# Patient Record
Sex: FEMALE | Race: WHITE | Employment: FULL TIME | URBAN - METROPOLITAN AREA
[De-identification: names, ages, dates, MRNs, and addresses within clinical notes are randomized per-mention and may not be internally consistent; named-entity substitution may affect disease eponyms.]

---

## 2017-02-02 ENCOUNTER — HOSPITAL ENCOUNTER (OUTPATIENT)
Dept: ULTRASOUND IMAGING | Age: 37
Discharge: HOME | End: 2017-02-02
Attending: NURSE PRACTITIONER
Payer: COMMERCIAL

## 2017-02-02 DIAGNOSIS — R10.13 EPIGASTRIC ABDOMINAL PAIN: ICD-10-CM

## 2017-02-02 DIAGNOSIS — R74.01 ELEVATED ALT MEASUREMENT: ICD-10-CM

## 2017-02-02 DIAGNOSIS — K92.1 MELENA: ICD-10-CM

## 2017-02-02 DIAGNOSIS — R74.01 ELEVATED AST (SGOT): ICD-10-CM

## 2017-02-02 PROCEDURE — 76700 US EXAM ABDOM COMPLETE: CPT

## 2017-02-02 NOTE — PROGRESS NOTES
Abdominal U/S Impression: Negative for gallstones or biliary tree obstruction. Probable fatty infiltration of the liver. Please notify patient of results/obtain updated plan of care per GI.

## 2017-02-03 NOTE — PROGRESS NOTES
I called patient and discuss her ultrasound results. Patient verbalized understanding on all and states that she saw GI yesterday and they sent her for an endoscopy this morning. Says they didn't find anything so they are getting her scheduled for a colonoscopy next.

## 2018-06-01 PROBLEM — Z72.0 TOBACCO USE: Chronic | Status: ACTIVE | Noted: 2018-06-01

## 2018-06-01 PROBLEM — R74.8 ELEVATED LIVER ENZYMES: Status: ACTIVE | Noted: 2018-06-01

## 2018-06-01 PROBLEM — N93.9 ABNORMAL UTERINE BLEEDING: Status: ACTIVE | Noted: 2018-05-30

## 2022-03-18 PROBLEM — Z72.0 TOBACCO USE: Status: ACTIVE | Noted: 2018-06-01

## 2022-03-19 PROBLEM — N93.9 ABNORMAL UTERINE BLEEDING: Status: ACTIVE | Noted: 2018-05-30

## 2022-03-19 PROBLEM — R74.8 ELEVATED LIVER ENZYMES: Status: ACTIVE | Noted: 2018-06-01

## 2024-08-20 ENCOUNTER — OFFICE VISIT (OUTPATIENT)
Dept: FAMILY MEDICINE CLINIC | Facility: CLINIC | Age: 44
End: 2024-08-20
Payer: COMMERCIAL

## 2024-08-20 VITALS
TEMPERATURE: 98 F | HEART RATE: 74 BPM | OXYGEN SATURATION: 98 % | DIASTOLIC BLOOD PRESSURE: 78 MMHG | BODY MASS INDEX: 26.8 KG/M2 | HEIGHT: 64 IN | WEIGHT: 157 LBS | SYSTOLIC BLOOD PRESSURE: 108 MMHG

## 2024-08-20 DIAGNOSIS — K90.41 GLUTEN INTOLERANCE: ICD-10-CM

## 2024-08-20 DIAGNOSIS — R53.82 CHRONIC FATIGUE: Primary | ICD-10-CM

## 2024-08-20 DIAGNOSIS — G89.29 CHRONIC RIGHT SHOULDER PAIN: ICD-10-CM

## 2024-08-20 DIAGNOSIS — M25.511 CHRONIC RIGHT SHOULDER PAIN: ICD-10-CM

## 2024-08-20 PROBLEM — J32.4 CHRONIC PANSINUSITIS: Status: ACTIVE | Noted: 2022-10-17

## 2024-08-20 PROBLEM — N39.3 SUI (STRESS URINARY INCONTINENCE, FEMALE): Status: ACTIVE | Noted: 2021-04-15

## 2024-08-20 PROBLEM — N28.89 RENAL MASS: Status: ACTIVE | Noted: 2020-05-21

## 2024-08-20 PROBLEM — Z90.710 S/P LAPAROSCOPIC HYSTERECTOMY: Status: ACTIVE | Noted: 2018-09-14

## 2024-08-20 PROBLEM — Z72.0 TOBACCO USE: Status: RESOLVED | Noted: 2018-06-01 | Resolved: 2024-08-20

## 2024-08-20 PROBLEM — N30.01 ACUTE CYSTITIS WITH HEMATURIA: Status: ACTIVE | Noted: 2024-08-05

## 2024-08-20 PROCEDURE — 99203 OFFICE O/P NEW LOW 30 MIN: CPT | Performed by: FAMILY MEDICINE

## 2024-08-20 RX ORDER — DICYCLOMINE HCL 20 MG
20 TABLET ORAL PRN
COMMUNITY

## 2024-08-20 SDOH — ECONOMIC STABILITY: FOOD INSECURITY: WITHIN THE PAST 12 MONTHS, YOU WORRIED THAT YOUR FOOD WOULD RUN OUT BEFORE YOU GOT MONEY TO BUY MORE.: NEVER TRUE

## 2024-08-20 SDOH — HEALTH STABILITY: PHYSICAL HEALTH: ON AVERAGE, HOW MANY DAYS PER WEEK DO YOU ENGAGE IN MODERATE TO STRENUOUS EXERCISE (LIKE A BRISK WALK)?: 5 DAYS

## 2024-08-20 SDOH — ECONOMIC STABILITY: FOOD INSECURITY: WITHIN THE PAST 12 MONTHS, THE FOOD YOU BOUGHT JUST DIDN'T LAST AND YOU DIDN'T HAVE MONEY TO GET MORE.: NEVER TRUE

## 2024-08-20 SDOH — HEALTH STABILITY: PHYSICAL HEALTH: ON AVERAGE, HOW MANY MINUTES DO YOU ENGAGE IN EXERCISE AT THIS LEVEL?: 60 MIN

## 2024-08-20 SDOH — ECONOMIC STABILITY: INCOME INSECURITY: HOW HARD IS IT FOR YOU TO PAY FOR THE VERY BASICS LIKE FOOD, HOUSING, MEDICAL CARE, AND HEATING?: NOT HARD AT ALL

## 2024-08-20 ASSESSMENT — PATIENT HEALTH QUESTIONNAIRE - PHQ9
SUM OF ALL RESPONSES TO PHQ QUESTIONS 1-9: 0
1. LITTLE INTEREST OR PLEASURE IN DOING THINGS: NOT AT ALL
SUM OF ALL RESPONSES TO PHQ QUESTIONS 1-9: 0
2. FEELING DOWN, DEPRESSED OR HOPELESS: NOT AT ALL
SUM OF ALL RESPONSES TO PHQ9 QUESTIONS 1 & 2: 0

## 2024-08-20 ASSESSMENT — ENCOUNTER SYMPTOMS
BLOOD IN STOOL: 0
ABDOMINAL PAIN: 0
SHORTNESS OF BREATH: 0
BACK PAIN: 0
COUGH: 0
CHEST TIGHTNESS: 0
DIARRHEA: 0
CONSTIPATION: 0

## 2024-08-20 NOTE — PROGRESS NOTES
Vaccine:   COVID-19 vaccine:   Tetanus Status:  Pneumonia Vaccine:  Shingles Vaccine:     ROS  Review of Systems   Constitutional:  Positive for fatigue. Negative for activity change, appetite change and unexpected weight change.   HENT: Negative.     Eyes:  Negative for visual disturbance.   Respiratory:  Negative for cough, chest tightness and shortness of breath.    Cardiovascular:  Negative for chest pain, palpitations and leg swelling.   Gastrointestinal:  Negative for abdominal pain, blood in stool, constipation and diarrhea.   Genitourinary:  Negative for decreased urine volume, dysuria, frequency, hematuria, menstrual problem and urgency.   Musculoskeletal:  Positive for arthralgias. Negative for back pain and myalgias.         Right shoulder pain   Skin: Negative.    Allergic/Immunologic: Negative for environmental allergies.   Neurological:  Negative for light-headedness and headaches.   Hematological: Negative.    Psychiatric/Behavioral:  Negative for sleep disturbance.        Visit Vitals  /78 (Site: Left Upper Arm, Position: Sitting, Cuff Size: Medium Adult)   Pulse 74   Temp 98 °F (36.7 °C) (Temporal)   Ht 1.626 m (5' 4\")   Wt 71.2 kg (157 lb)   SpO2 98%   BMI 26.95 kg/m²       Wt Readings from Last 3 Encounters:   08/20/24 71.2 kg (157 lb)      BP Readings from Last 3 Encounters:   08/20/24 108/78        Physical Exam  Physical Exam  Constitutional:       Appearance: Normal appearance. She is normal weight.   HENT:      Head: Normocephalic and atraumatic.      Right Ear: Tympanic membrane normal.      Left Ear: Tympanic membrane normal.   Neck:      Vascular: No carotid bruit.   Cardiovascular:      Rate and Rhythm: Normal rate and regular rhythm.      Heart sounds: Normal heart sounds. No murmur heard.  Pulmonary:      Effort: Pulmonary effort is normal.      Breath sounds: Normal breath sounds.   Abdominal:      General: Abdomen is flat. Bowel sounds are normal.      Palpations: Abdomen is

## 2024-08-27 ENCOUNTER — TELEPHONE (OUTPATIENT)
Dept: FAMILY MEDICINE CLINIC | Facility: CLINIC | Age: 44
End: 2024-08-27

## 2024-09-18 ENCOUNTER — TELEPHONE (OUTPATIENT)
Dept: FAMILY MEDICINE CLINIC | Facility: CLINIC | Age: 44
End: 2024-09-18

## 2024-09-18 DIAGNOSIS — R53.82 CHRONIC FATIGUE: ICD-10-CM

## 2024-09-18 DIAGNOSIS — Z85.528 HISTORY OF RENAL CELL CARCINOMA: Primary | ICD-10-CM

## 2024-09-19 DIAGNOSIS — R53.82 CHRONIC FATIGUE: ICD-10-CM

## 2024-09-19 LAB
25(OH)D3 SERPL-MCNC: 26.9 NG/ML (ref 30–100)
ESTRADIOL SERPL-MCNC: 84.8 PG/ML
FSH SERPL-ACNC: 3.5 MIU/ML
LH SERPL-ACNC: 5.5 MIU/ML
VIT B12 SERPL-MCNC: 588 PG/ML (ref 193–986)

## 2024-09-26 ENCOUNTER — OFFICE VISIT (OUTPATIENT)
Dept: FAMILY MEDICINE CLINIC | Facility: CLINIC | Age: 44
End: 2024-09-26
Payer: COMMERCIAL

## 2024-09-26 VITALS
TEMPERATURE: 97.5 F | SYSTOLIC BLOOD PRESSURE: 112 MMHG | WEIGHT: 157 LBS | HEART RATE: 91 BPM | OXYGEN SATURATION: 98 % | BODY MASS INDEX: 26.8 KG/M2 | DIASTOLIC BLOOD PRESSURE: 76 MMHG | HEIGHT: 64 IN

## 2024-09-26 DIAGNOSIS — Z85.528 HISTORY OF RENAL CELL CARCINOMA: ICD-10-CM

## 2024-09-26 DIAGNOSIS — R53.82 CHRONIC FATIGUE: ICD-10-CM

## 2024-09-26 DIAGNOSIS — K90.41 GLUTEN INTOLERANCE: ICD-10-CM

## 2024-09-26 DIAGNOSIS — J01.00 ACUTE NON-RECURRENT MAXILLARY SINUSITIS: Primary | ICD-10-CM

## 2024-09-26 PROCEDURE — 99214 OFFICE O/P EST MOD 30 MIN: CPT | Performed by: FAMILY MEDICINE

## 2024-09-26 RX ORDER — DICYCLOMINE HCL 20 MG
20 TABLET ORAL 4 TIMES DAILY
Qty: 120 TABLET | Refills: 3 | Status: SHIPPED | OUTPATIENT
Start: 2024-09-26 | End: 2024-12-25

## 2024-09-26 RX ORDER — SULFAMETHOXAZOLE/TRIMETHOPRIM 800-160 MG
1 TABLET ORAL 2 TIMES DAILY
Qty: 20 TABLET | Refills: 0 | Status: SHIPPED | OUTPATIENT
Start: 2024-09-26 | End: 2024-10-06

## 2024-09-26 RX ORDER — FLUTICASONE PROPIONATE 50 MCG
2 SPRAY, SUSPENSION (ML) NASAL DAILY
Qty: 48 G | Refills: 3 | Status: SHIPPED | OUTPATIENT
Start: 2024-09-26

## 2024-09-26 ASSESSMENT — ENCOUNTER SYMPTOMS
SHORTNESS OF BREATH: 0
CONSTIPATION: 0
SINUS PRESSURE: 1
DIARRHEA: 0
COUGH: 0
RHINORRHEA: 0
SORE THROAT: 0
WHEEZING: 0

## 2025-07-22 ENCOUNTER — OFFICE VISIT (OUTPATIENT)
Dept: FAMILY MEDICINE CLINIC | Facility: CLINIC | Age: 45
End: 2025-07-22
Payer: COMMERCIAL

## 2025-07-22 VITALS
HEIGHT: 64 IN | DIASTOLIC BLOOD PRESSURE: 80 MMHG | HEART RATE: 90 BPM | WEIGHT: 167.38 LBS | SYSTOLIC BLOOD PRESSURE: 120 MMHG | TEMPERATURE: 97.9 F | OXYGEN SATURATION: 98 % | BODY MASS INDEX: 28.57 KG/M2

## 2025-07-22 DIAGNOSIS — H83.3X3 NOISE-INDUCED HEARING LOSS OF BOTH EARS: Primary | ICD-10-CM

## 2025-07-22 DIAGNOSIS — Z12.31 ENCOUNTER FOR SCREENING MAMMOGRAM FOR MALIGNANT NEOPLASM OF BREAST: ICD-10-CM

## 2025-07-22 DIAGNOSIS — J32.0 CHRONIC MAXILLARY SINUSITIS: ICD-10-CM

## 2025-07-22 DIAGNOSIS — K90.41 GLUTEN INTOLERANCE: ICD-10-CM

## 2025-07-22 PROCEDURE — 99214 OFFICE O/P EST MOD 30 MIN: CPT | Performed by: FAMILY MEDICINE

## 2025-07-22 RX ORDER — DICYCLOMINE HCL 20 MG
20 TABLET ORAL 4 TIMES DAILY
Qty: 120 TABLET | Refills: 3 | Status: SHIPPED | OUTPATIENT
Start: 2025-07-22 | End: 2025-10-20

## 2025-07-22 RX ORDER — FLUTICASONE PROPIONATE 50 MCG
2 SPRAY, SUSPENSION (ML) NASAL DAILY
Qty: 48 G | Refills: 3 | Status: SHIPPED | OUTPATIENT
Start: 2025-07-22

## 2025-07-22 SDOH — ECONOMIC STABILITY: FOOD INSECURITY: WITHIN THE PAST 12 MONTHS, THE FOOD YOU BOUGHT JUST DIDN'T LAST AND YOU DIDN'T HAVE MONEY TO GET MORE.: NEVER TRUE

## 2025-07-22 SDOH — ECONOMIC STABILITY: FOOD INSECURITY: WITHIN THE PAST 12 MONTHS, YOU WORRIED THAT YOUR FOOD WOULD RUN OUT BEFORE YOU GOT MONEY TO BUY MORE.: NEVER TRUE

## 2025-07-22 ASSESSMENT — PATIENT HEALTH QUESTIONNAIRE - PHQ9
1. LITTLE INTEREST OR PLEASURE IN DOING THINGS: NOT AT ALL
SUM OF ALL RESPONSES TO PHQ QUESTIONS 1-9: 0
SUM OF ALL RESPONSES TO PHQ QUESTIONS 1-9: 0
2. FEELING DOWN, DEPRESSED OR HOPELESS: NOT AT ALL
SUM OF ALL RESPONSES TO PHQ QUESTIONS 1-9: 0
1. LITTLE INTEREST OR PLEASURE IN DOING THINGS: NOT AT ALL
SUM OF ALL RESPONSES TO PHQ QUESTIONS 1-9: 0
SUM OF ALL RESPONSES TO PHQ9 QUESTIONS 1 & 2: 0
2. FEELING DOWN, DEPRESSED OR HOPELESS: NOT AT ALL

## 2025-07-22 ASSESSMENT — ENCOUNTER SYMPTOMS
SINUS PRESSURE: 0
SINUS PAIN: 0

## 2025-07-22 NOTE — PROGRESS NOTES
[facility]  [address] [place] [address]  Phone [phone]  Fax:  [phone]    Patient: April Campbell  YOB: 1980  Patient Age 45 y.o.  Patient sex: female  Medical Record:  [id number]  Visit Date: 07/23/25    [facility] Clinic Note     Chief Complaint   Patient presents with    Ear Fullness     Trouble hearing       History of Present Illness:  History of Present Illness  The patient presents for evaluation of ear fullness and hearing loss.    She has been experiencing ear fullness and hearing loss for approximately a year, with symptoms progressively worsening. She reports difficulty in hearing, often requiring others to repeat themselves, and has been told that she speaks loudly. She does not experience ringing in her ears. Over the past few weeks, she has been experiencing discomfort in her ears, which she describes as tenderness rather than pain. She reports no dental issues, fevers, or chills. She recalls listening to loud music in her youth. She has attempted to contact an ENT specialist, Dr. Trejo, whom she last saw in 2023, but was advised to consult her primary care physician for a hearing test first.     She underwent sinus surgery a few years ago and was informed that the irritation would subside, but it has persisted. Post-surgery, her sinuses have improved significantly. She uses [employer] regularly and only experiences issues during weather changes. 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 She has been working around refrigeration for 20 years and is unsure if this could be contributing to her symptoms.    She is overdue for her mammogram, which was last done in 2022.    She takes Bentyl as needed and tries to maintain a gluten-free diet. She believes she is lactose intolerant and uses A2 milk for cereal, which has resolved

## 2025-07-23 ASSESSMENT — ENCOUNTER SYMPTOMS
CONSTIPATION: 0
DIARRHEA: 0
COUGH: 0

## 2025-07-28 ENCOUNTER — TELEPHONE (OUTPATIENT)
Dept: FAMILY MEDICINE CLINIC | Facility: CLINIC | Age: 45
End: 2025-07-28

## 2025-08-21 ENCOUNTER — OFFICE VISIT (OUTPATIENT)
Dept: AUDIOLOGY | Age: 45
End: 2025-08-21
Payer: COMMERCIAL

## 2025-08-21 DIAGNOSIS — H90.3 SENSORINEURAL HEARING LOSS, BILATERAL: Primary | ICD-10-CM

## 2025-08-21 PROCEDURE — 92567 TYMPANOMETRY: CPT | Performed by: AUDIOLOGIST

## 2025-08-21 PROCEDURE — 92557 COMPREHENSIVE HEARING TEST: CPT | Performed by: AUDIOLOGIST

## 2025-09-07 ENCOUNTER — RESULTS FOLLOW-UP (OUTPATIENT)
Dept: FAMILY MEDICINE CLINIC | Facility: CLINIC | Age: 45
End: 2025-09-07